# Patient Record
Sex: MALE | Race: WHITE | NOT HISPANIC OR LATINO | URBAN - METROPOLITAN AREA
[De-identification: names, ages, dates, MRNs, and addresses within clinical notes are randomized per-mention and may not be internally consistent; named-entity substitution may affect disease eponyms.]

---

## 2017-11-21 ENCOUNTER — EMERGENCY (EMERGENCY)
Facility: HOSPITAL | Age: 47
LOS: 1 days | End: 2017-11-21
Attending: EMERGENCY MEDICINE | Admitting: EMERGENCY MEDICINE
Payer: COMMERCIAL

## 2017-11-21 VITALS
WEIGHT: 182.98 LBS | RESPIRATION RATE: 18 BRPM | OXYGEN SATURATION: 98 % | SYSTOLIC BLOOD PRESSURE: 131 MMHG | TEMPERATURE: 100 F | HEIGHT: 71 IN | DIASTOLIC BLOOD PRESSURE: 76 MMHG

## 2017-11-21 DIAGNOSIS — Z98.890 OTHER SPECIFIED POSTPROCEDURAL STATES: Chronic | ICD-10-CM

## 2017-11-21 LAB
ALBUMIN SERPL ELPH-MCNC: 3.6 G/DL — SIGNIFICANT CHANGE UP (ref 3.3–5)
ALP SERPL-CCNC: 53 U/L — SIGNIFICANT CHANGE UP (ref 30–120)
ALT FLD-CCNC: 25 U/L DA — SIGNIFICANT CHANGE UP (ref 10–60)
ANION GAP SERPL CALC-SCNC: 10 MMOL/L — SIGNIFICANT CHANGE UP (ref 5–17)
APPEARANCE UR: CLEAR — SIGNIFICANT CHANGE UP
AST SERPL-CCNC: 39 U/L — SIGNIFICANT CHANGE UP (ref 10–40)
BASOPHILS # BLD AUTO: 0 K/UL — SIGNIFICANT CHANGE UP (ref 0–0.2)
BASOPHILS NFR BLD AUTO: 0.8 % — SIGNIFICANT CHANGE UP (ref 0–2)
BILIRUB SERPL-MCNC: 0.6 MG/DL — SIGNIFICANT CHANGE UP (ref 0.2–1.2)
BILIRUB UR-MCNC: NEGATIVE — SIGNIFICANT CHANGE UP
BUN SERPL-MCNC: 15 MG/DL — SIGNIFICANT CHANGE UP (ref 7–23)
CALCIUM SERPL-MCNC: 9 MG/DL — SIGNIFICANT CHANGE UP (ref 8.4–10.5)
CHLORIDE SERPL-SCNC: 101 MMOL/L — SIGNIFICANT CHANGE UP (ref 96–108)
CO2 SERPL-SCNC: 25 MMOL/L — SIGNIFICANT CHANGE UP (ref 22–31)
COLOR SPEC: YELLOW — SIGNIFICANT CHANGE UP
CREAT SERPL-MCNC: 1.14 MG/DL — SIGNIFICANT CHANGE UP (ref 0.5–1.3)
DIFF PNL FLD: ABNORMAL
EOSINOPHIL # BLD AUTO: 0 K/UL — SIGNIFICANT CHANGE UP (ref 0–0.5)
EOSINOPHIL NFR BLD AUTO: 0.2 % — SIGNIFICANT CHANGE UP (ref 0–6)
GLUCOSE SERPL-MCNC: 120 MG/DL — HIGH (ref 70–99)
GLUCOSE UR QL: NEGATIVE MG/DL — SIGNIFICANT CHANGE UP
HCT VFR BLD CALC: 43.2 % — SIGNIFICANT CHANGE UP (ref 39–50)
HGB BLD-MCNC: 14.3 G/DL — SIGNIFICANT CHANGE UP (ref 13–17)
KETONES UR-MCNC: NEGATIVE — SIGNIFICANT CHANGE UP
LACTATE SERPL-SCNC: 0.7 MMOL/L — SIGNIFICANT CHANGE UP (ref 0.7–2)
LEUKOCYTE ESTERASE UR-ACNC: ABNORMAL
LYMPHOCYTES # BLD AUTO: 0.5 K/UL — LOW (ref 1–3.3)
LYMPHOCYTES # BLD AUTO: 9.1 % — LOW (ref 13–44)
MCHC RBC-ENTMCNC: 31.4 PG — SIGNIFICANT CHANGE UP (ref 27–34)
MCHC RBC-ENTMCNC: 33.2 GM/DL — SIGNIFICANT CHANGE UP (ref 32–36)
MCV RBC AUTO: 94.5 FL — SIGNIFICANT CHANGE UP (ref 80–100)
MONOCYTES # BLD AUTO: 0.5 K/UL — SIGNIFICANT CHANGE UP (ref 0–0.9)
MONOCYTES NFR BLD AUTO: 8.7 % — SIGNIFICANT CHANGE UP (ref 2–14)
NEUTROPHILS # BLD AUTO: 4.3 K/UL — SIGNIFICANT CHANGE UP (ref 1.8–7.4)
NEUTROPHILS NFR BLD AUTO: 81.3 % — HIGH (ref 43–77)
NITRITE UR-MCNC: NEGATIVE — SIGNIFICANT CHANGE UP
PH UR: 7 — SIGNIFICANT CHANGE UP (ref 5–8)
PLATELET # BLD AUTO: 100 K/UL — LOW (ref 150–400)
POTASSIUM SERPL-MCNC: 4 MMOL/L — SIGNIFICANT CHANGE UP (ref 3.5–5.3)
POTASSIUM SERPL-SCNC: 4 MMOL/L — SIGNIFICANT CHANGE UP (ref 3.5–5.3)
PROT SERPL-MCNC: 7 G/DL — SIGNIFICANT CHANGE UP (ref 6–8.3)
PROT UR-MCNC: NEGATIVE MG/DL — SIGNIFICANT CHANGE UP
RBC # BLD: 4.57 M/UL — SIGNIFICANT CHANGE UP (ref 4.2–5.8)
RBC # FLD: 11.4 % — SIGNIFICANT CHANGE UP (ref 10.3–14.5)
SODIUM SERPL-SCNC: 136 MMOL/L — SIGNIFICANT CHANGE UP (ref 135–145)
SP GR SPEC: 1 — LOW (ref 1.01–1.02)
UROBILINOGEN FLD QL: NEGATIVE MG/DL — SIGNIFICANT CHANGE UP
WBC # BLD: 5.3 K/UL — SIGNIFICANT CHANGE UP (ref 3.8–10.5)
WBC # FLD AUTO: 5.3 K/UL — SIGNIFICANT CHANGE UP (ref 3.8–10.5)

## 2017-11-21 PROCEDURE — 81001 URINALYSIS AUTO W/SCOPE: CPT

## 2017-11-21 PROCEDURE — 99284 EMERGENCY DEPT VISIT MOD MDM: CPT

## 2017-11-21 PROCEDURE — 85027 COMPLETE CBC AUTOMATED: CPT

## 2017-11-21 PROCEDURE — 74176 CT ABD & PELVIS W/O CONTRAST: CPT

## 2017-11-21 PROCEDURE — 74176 CT ABD & PELVIS W/O CONTRAST: CPT | Mod: 26

## 2017-11-21 PROCEDURE — 96361 HYDRATE IV INFUSION ADD-ON: CPT

## 2017-11-21 PROCEDURE — 87086 URINE CULTURE/COLONY COUNT: CPT

## 2017-11-21 PROCEDURE — 96374 THER/PROPH/DIAG INJ IV PUSH: CPT

## 2017-11-21 PROCEDURE — 83605 ASSAY OF LACTIC ACID: CPT

## 2017-11-21 PROCEDURE — 99284 EMERGENCY DEPT VISIT MOD MDM: CPT | Mod: 25

## 2017-11-21 PROCEDURE — 80053 COMPREHEN METABOLIC PANEL: CPT

## 2017-11-21 PROCEDURE — 87040 BLOOD CULTURE FOR BACTERIA: CPT

## 2017-11-21 RX ORDER — SODIUM CHLORIDE 9 MG/ML
1000 INJECTION INTRAMUSCULAR; INTRAVENOUS; SUBCUTANEOUS ONCE
Qty: 0 | Refills: 0 | Status: COMPLETED | OUTPATIENT
Start: 2017-11-21 | End: 2017-11-21

## 2017-11-21 RX ORDER — CEFUROXIME AXETIL 250 MG
1 TABLET ORAL
Qty: 14 | Refills: 0 | OUTPATIENT
Start: 2017-11-21 | End: 2017-11-28

## 2017-11-21 RX ORDER — CEFTRIAXONE 500 MG/1
1 INJECTION, POWDER, FOR SOLUTION INTRAMUSCULAR; INTRAVENOUS ONCE
Qty: 0 | Refills: 0 | Status: COMPLETED | OUTPATIENT
Start: 2017-11-21 | End: 2017-11-21

## 2017-11-21 RX ADMIN — CEFTRIAXONE 100 GRAM(S): 500 INJECTION, POWDER, FOR SOLUTION INTRAMUSCULAR; INTRAVENOUS at 23:44

## 2017-11-21 RX ADMIN — SODIUM CHLORIDE 1000 MILLILITER(S): 9 INJECTION INTRAMUSCULAR; INTRAVENOUS; SUBCUTANEOUS at 22:00

## 2017-11-21 NOTE — ED ADULT TRIAGE NOTE - CHIEF COMPLAINT QUOTE
" Since this past Sunday, Im been running fever, body aches and cold sweats . I was confirmed with UTI and negative for flu "

## 2017-11-21 NOTE — ED PROVIDER NOTE - OBJECTIVE STATEMENT
47yo male who presents with body aches and fever for 2 days. pt states since sunday he has had fever up to 102.7 with body aches, no cough, no vomiting or diarrhea, pt was seen at urgent care and started on macrobid for a UTI, flu was negative, pt has been aalternating with motrin and tylenol with little relief of fever, no vomiting and diarrhea, last dose of motrin was 8pm, no other complaints

## 2017-11-21 NOTE — ED ADULT NURSE NOTE - OBJECTIVE STATEMENT
Patient presents to ED with fever, body aches and generalized malaise since Sunday. Patient reports using Advil and Motrin for fever but reports minimal relief. Denies any nausea, vomiting or diarrhea. Went to urgent care for UTI symptoms and was prescribed MacroBid

## 2017-11-22 VITALS
DIASTOLIC BLOOD PRESSURE: 64 MMHG | HEART RATE: 78 BPM | RESPIRATION RATE: 18 BRPM | TEMPERATURE: 98 F | OXYGEN SATURATION: 97 % | SYSTOLIC BLOOD PRESSURE: 105 MMHG

## 2017-11-22 RX ORDER — IBUPROFEN 200 MG
2 TABLET ORAL
Qty: 0 | Refills: 0 | COMMUNITY

## 2017-11-22 RX ORDER — NITROFURANTOIN MACROCRYSTAL 50 MG
1 CAPSULE ORAL
Qty: 0 | Refills: 0 | COMMUNITY

## 2017-11-22 NOTE — ED ADULT NURSE REASSESSMENT NOTE - NS ED NURSE REASSESS COMMENT FT1
Patient was discharged in stable condition, instructions were provided and reviewed, demonstrated understanding, left accompanied by spouse and ambulatory.

## 2017-11-23 LAB
CULTURE RESULTS: NO GROWTH — SIGNIFICANT CHANGE UP
SPECIMEN SOURCE: SIGNIFICANT CHANGE UP

## 2017-11-27 LAB
CULTURE RESULTS: SIGNIFICANT CHANGE UP
CULTURE RESULTS: SIGNIFICANT CHANGE UP
SPECIMEN SOURCE: SIGNIFICANT CHANGE UP
SPECIMEN SOURCE: SIGNIFICANT CHANGE UP

## 2017-12-28 ENCOUNTER — APPOINTMENT (OUTPATIENT)
Dept: UROLOGY | Facility: CLINIC | Age: 47
End: 2017-12-28
Payer: COMMERCIAL

## 2017-12-28 VITALS
SYSTOLIC BLOOD PRESSURE: 110 MMHG | HEART RATE: 64 BPM | WEIGHT: 182 LBS | DIASTOLIC BLOOD PRESSURE: 74 MMHG | TEMPERATURE: 97.5 F | BODY MASS INDEX: 25.48 KG/M2 | HEIGHT: 71 IN | OXYGEN SATURATION: 87 %

## 2017-12-28 DIAGNOSIS — N41.0 ACUTE PROSTATITIS: ICD-10-CM

## 2017-12-28 DIAGNOSIS — J06.9 ACUTE UPPER RESPIRATORY INFECTION, UNSPECIFIED: ICD-10-CM

## 2017-12-28 DIAGNOSIS — Z87.898 PERSONAL HISTORY OF OTHER SPECIFIED CONDITIONS: ICD-10-CM

## 2017-12-28 DIAGNOSIS — Z86.79 PERSONAL HISTORY OF OTHER DISEASES OF THE CIRCULATORY SYSTEM: ICD-10-CM

## 2017-12-28 PROCEDURE — 99203 OFFICE O/P NEW LOW 30 MIN: CPT

## 2017-12-28 RX ORDER — AZITHROMYCIN 250 MG/1
250 TABLET, FILM COATED ORAL
Qty: 6 | Refills: 0 | Status: DISCONTINUED | COMMUNITY
Start: 2017-07-26 | End: 2017-12-28

## 2017-12-28 RX ORDER — NITROFURANTOIN (MONOHYDRATE/MACROCRYSTALS) 25; 75 MG/1; MG/1
100 CAPSULE ORAL
Qty: 14 | Refills: 0 | Status: DISCONTINUED | COMMUNITY
Start: 2017-11-20 | End: 2017-12-28

## 2017-12-28 RX ORDER — CIPROFLOXACIN HYDROCHLORIDE 500 MG/1
500 TABLET, FILM COATED ORAL
Qty: 14 | Refills: 0 | Status: DISCONTINUED | COMMUNITY
Start: 2017-10-24 | End: 2017-12-28

## 2017-12-28 RX ORDER — CEFUROXIME AXETIL 500 MG/1
500 TABLET ORAL
Qty: 14 | Refills: 0 | Status: DISCONTINUED | COMMUNITY
Start: 2017-11-22 | End: 2017-12-28

## 2017-12-29 LAB
APPEARANCE: CLEAR
BACTERIA: NEGATIVE
BILIRUBIN URINE: NEGATIVE
BLOOD URINE: NEGATIVE
COLOR: YELLOW
GLUCOSE QUALITATIVE U: NEGATIVE MG/DL
HYALINE CASTS: 0 /LPF
KETONES URINE: NEGATIVE
LEUKOCYTE ESTERASE URINE: ABNORMAL
MICROSCOPIC-UA: NORMAL
NITRITE URINE: NEGATIVE
PH URINE: 6
PROTEIN URINE: NEGATIVE MG/DL
RED BLOOD CELLS URINE: 2 /HPF
SPECIFIC GRAVITY URINE: 1.01
SQUAMOUS EPITHELIAL CELLS: 0 /HPF
UROBILINOGEN URINE: NEGATIVE MG/DL
WHITE BLOOD CELLS URINE: 2 /HPF

## 2018-01-01 ENCOUNTER — TRANSCRIPTION ENCOUNTER (OUTPATIENT)
Age: 48
End: 2018-01-01

## 2018-01-02 LAB — BACTERIA UR CULT: NORMAL

## 2021-03-09 ENCOUNTER — TRANSCRIPTION ENCOUNTER (OUTPATIENT)
Age: 51
End: 2021-03-09

## 2021-03-18 ENCOUNTER — APPOINTMENT (OUTPATIENT)
Dept: SURGERY | Facility: CLINIC | Age: 51
End: 2021-03-18
Payer: COMMERCIAL

## 2021-03-18 VITALS
HEART RATE: 78 BPM | SYSTOLIC BLOOD PRESSURE: 134 MMHG | WEIGHT: 189 LBS | BODY MASS INDEX: 26.46 KG/M2 | HEIGHT: 71 IN | TEMPERATURE: 97.6 F | OXYGEN SATURATION: 100 % | DIASTOLIC BLOOD PRESSURE: 79 MMHG

## 2021-03-18 PROCEDURE — 99072 ADDL SUPL MATRL&STAF TM PHE: CPT

## 2021-03-18 PROCEDURE — 99202 OFFICE O/P NEW SF 15 MIN: CPT

## 2021-03-18 NOTE — PLAN
[FreeTextEntry1] : All risks, benefits and options discussed. Recommend elective excision of scalp mass

## 2021-03-18 NOTE — HISTORY OF PRESENT ILLNESS
[de-identified] : right scalp mass [de-identified] : 50 year old white male who presents with a right scalp mass that has become more noticeable as he is losing his hair. He has had it for the last few years and has not noticed any change in the size and has had no drainage from the area.

## 2021-03-18 NOTE — PHYSICAL EXAM
[JVD] : no jugular venous distention  [Normal Thyroid] : the thyroid was normal [Carotid Bruits] : no carotid bruits [Normal Breath Sounds] : Normal breath sounds [Normal Heart Sounds] : normal heart sounds [Normal Rate and Rhythm] : normal rate and rhythm [No Rash or Lesion] : No rash or lesion [Alert] : alert [Oriented to Person] : oriented to person [Oriented to Place] : oriented to place [Oriented to Time] : oriented to time [Calm] : calm [de-identified] : well developed white male in no acute distress [de-identified] : 3 by 3 mass [de-identified] : without adenopathy [de-identified] : benign [de-identified] : without calf pain or swelling

## 2021-03-26 ENCOUNTER — OUTPATIENT (OUTPATIENT)
Dept: OUTPATIENT SERVICES | Facility: HOSPITAL | Age: 51
LOS: 1 days | End: 2021-03-26
Payer: COMMERCIAL

## 2021-03-26 DIAGNOSIS — Z20.828 CONTACT WITH AND (SUSPECTED) EXPOSURE TO OTHER VIRAL COMMUNICABLE DISEASES: ICD-10-CM

## 2021-03-26 DIAGNOSIS — Z98.890 OTHER SPECIFIED POSTPROCEDURAL STATES: Chronic | ICD-10-CM

## 2021-03-26 LAB — SARS-COV-2 RNA SPEC QL NAA+PROBE: SIGNIFICANT CHANGE UP

## 2021-03-26 PROCEDURE — U0003: CPT

## 2021-03-26 PROCEDURE — U0005: CPT

## 2021-03-28 ENCOUNTER — TRANSCRIPTION ENCOUNTER (OUTPATIENT)
Age: 51
End: 2021-03-28

## 2021-03-29 ENCOUNTER — OUTPATIENT (OUTPATIENT)
Dept: OUTPATIENT SERVICES | Facility: HOSPITAL | Age: 51
LOS: 1 days | End: 2021-03-29
Payer: COMMERCIAL

## 2021-03-29 ENCOUNTER — APPOINTMENT (OUTPATIENT)
Dept: SURGERY | Facility: HOSPITAL | Age: 51
End: 2021-03-29

## 2021-03-29 ENCOUNTER — RESULT REVIEW (OUTPATIENT)
Age: 51
End: 2021-03-29

## 2021-03-29 DIAGNOSIS — Z98.890 OTHER SPECIFIED POSTPROCEDURAL STATES: Chronic | ICD-10-CM

## 2021-03-29 DIAGNOSIS — D48.1 NEOPLASM OF UNCERTAIN BEHAVIOR OF CONNECTIVE AND OTHER SOFT TISSUE: ICD-10-CM

## 2021-03-29 PROCEDURE — 88305 TISSUE EXAM BY PATHOLOGIST: CPT

## 2021-03-29 PROCEDURE — 88305 TISSUE EXAM BY PATHOLOGIST: CPT | Mod: 26

## 2021-03-29 PROCEDURE — 11424 EXC H-F-NK-SP B9+MARG 3.1-4: CPT

## 2021-03-29 PROCEDURE — 21012 EXC FACE LES SBQ 2 CM/>: CPT

## 2021-03-30 LAB — SURGICAL PATHOLOGY STUDY: SIGNIFICANT CHANGE UP

## 2021-04-05 ENCOUNTER — APPOINTMENT (OUTPATIENT)
Dept: SURGERY | Facility: CLINIC | Age: 51
End: 2021-04-05

## 2021-04-05 VITALS — TEMPERATURE: 97.8 F

## 2021-04-05 NOTE — PHYSICAL EXAM
[Normal Breath Sounds] : Normal breath sounds [Normal Heart Sounds] : normal heart sounds [Normal Rate and Rhythm] : normal rate and rhythm [de-identified] : well developed male in no distress [de-identified] : incision clean and closed, 1/2 sutures d/janeth. [de-identified] : benign

## 2021-04-05 NOTE — HISTORY OF PRESENT ILLNESS
[de-identified] : s/p excision of scalp mass on 3/29/21 [de-identified] : pt without complaints, minimal pain. Without redness or swelling.

## 2021-04-12 ENCOUNTER — APPOINTMENT (OUTPATIENT)
Dept: SURGERY | Facility: CLINIC | Age: 51
End: 2021-04-12
Payer: COMMERCIAL

## 2021-04-12 VITALS — TEMPERATURE: 97.6 F

## 2021-04-12 DIAGNOSIS — R22.0 LOCALIZED SWELLING, MASS AND LUMP, HEAD: ICD-10-CM

## 2021-04-12 PROCEDURE — 99024 POSTOP FOLLOW-UP VISIT: CPT

## 2021-04-12 NOTE — PHYSICAL EXAM
[Normal Breath Sounds] : Normal breath sounds [Normal Heart Sounds] : normal heart sounds [Normal Rate and Rhythm] : normal rate and rhythm [de-identified] : scalp incision clean and closed, sutures d/janeth [de-identified] : well developed male in no acute distress

## 2021-04-12 NOTE — HISTORY OF PRESENT ILLNESS
[de-identified] : s/p excision of scalp mass on 3/29/21 [de-identified] : for suture removal. Without complaints

## 2022-02-16 ENCOUNTER — NON-APPOINTMENT (OUTPATIENT)
Age: 52
End: 2022-02-16

## 2022-02-17 ENCOUNTER — APPOINTMENT (OUTPATIENT)
Dept: SURGERY | Facility: CLINIC | Age: 52
End: 2022-02-17
Payer: COMMERCIAL

## 2022-02-17 VITALS
HEIGHT: 71 IN | WEIGHT: 189 LBS | DIASTOLIC BLOOD PRESSURE: 69 MMHG | OXYGEN SATURATION: 100 % | TEMPERATURE: 97.6 F | BODY MASS INDEX: 26.46 KG/M2 | HEART RATE: 81 BPM | SYSTOLIC BLOOD PRESSURE: 133 MMHG

## 2022-02-17 DIAGNOSIS — M79.89 OTHER SPECIFIED SOFT TISSUE DISORDERS: ICD-10-CM

## 2022-02-17 DIAGNOSIS — Z78.9 OTHER SPECIFIED HEALTH STATUS: ICD-10-CM

## 2022-02-17 PROCEDURE — 99212 OFFICE O/P EST SF 10 MIN: CPT

## 2022-02-17 NOTE — ASSESSMENT
[FreeTextEntry1] : I have discussed with pt that because of the location of the mass he needs to see a hand surgeon.

## 2022-02-17 NOTE — HISTORY OF PRESENT ILLNESS
[de-identified] : new mass of right palm [de-identified] : 51 year old white male c/o a new mass to the right palm for the past few weeks. He denies any pain in the area and has no history of trauma to the area.

## 2022-02-17 NOTE — PHYSICAL EXAM
[Normal Breath Sounds] : Normal breath sounds [Normal Heart Sounds] : normal heart sounds [Normal Rate and Rhythm] : normal rate and rhythm [Calm] : calm [de-identified] : well developed male in no distress [de-identified] : noninjected and nonicteric [de-identified] : without adenopathy [de-identified] : benign [de-identified] : right axilla without adenopathy, with a 3 by 3 cm hard mass of palm

## 2022-03-24 ENCOUNTER — RESULT REVIEW (OUTPATIENT)
Age: 52
End: 2022-03-24

## 2023-05-02 ENCOUNTER — NON-APPOINTMENT (OUTPATIENT)
Age: 53
End: 2023-05-02

## 2023-05-03 ENCOUNTER — NON-APPOINTMENT (OUTPATIENT)
Age: 53
End: 2023-05-03

## 2023-05-07 NOTE — ED PROVIDER NOTE - PSYCHIATRIC, MLM
[[email or web address]:[id number]==] Alert and oriented to person, place, time/situation. normal mood and affect. no apparent risk to self or others.

## 2023-05-09 ENCOUNTER — NON-APPOINTMENT (OUTPATIENT)
Age: 53
End: 2023-05-09

## 2023-05-09 ENCOUNTER — APPOINTMENT (OUTPATIENT)
Dept: OTOLARYNGOLOGY | Facility: CLINIC | Age: 53
End: 2023-05-09
Payer: COMMERCIAL

## 2023-05-09 VITALS
SYSTOLIC BLOOD PRESSURE: 146 MMHG | HEIGHT: 71 IN | BODY MASS INDEX: 24.78 KG/M2 | DIASTOLIC BLOOD PRESSURE: 76 MMHG | HEART RATE: 86 BPM | WEIGHT: 177 LBS

## 2023-05-09 PROCEDURE — 99204 OFFICE O/P NEW MOD 45 MIN: CPT | Mod: 25

## 2023-05-09 PROCEDURE — 31575 DIAGNOSTIC LARYNGOSCOPY: CPT

## 2023-05-09 RX ORDER — OFLOXACIN 3 MG/ML
0.3 SOLUTION/ DROPS OPHTHALMIC
Qty: 5 | Refills: 0 | Status: DISCONTINUED | COMMUNITY
Start: 2017-07-26 | End: 2023-05-09

## 2023-05-09 RX ORDER — CHLORHEXIDINE GLUCONATE, 0.12% ORAL RINSE 1.2 MG/ML
0.12 SOLUTION DENTAL
Qty: 473 | Refills: 0 | Status: DISCONTINUED | COMMUNITY
Start: 2017-12-02 | End: 2023-05-09

## 2023-05-09 NOTE — ASSESSMENT
[FreeTextEntry1] : no mucosal lesions seen in the oral cavity\par \par poss LPR\par diet/lifestyle instructions given to pt\par will hold off on meds pending results of testing\par \par strobe, FEES\par CT neck\par \par take photos of any oral lesions\par \par f/u after testing is complete

## 2023-05-09 NOTE — PHYSICAL EXAM
[Normal] : mucosa is normal [Midline] : trachea located in midline position [de-identified] : no sores or lesions

## 2023-05-09 NOTE — HISTORY OF PRESENT ILLNESS
[de-identified] : 52 yr old male c/o sore throat pain, swollen glands, sores on his gums and tongue, pain radiates to left ear since March.\par +recent hoarseness, occ throat clearing\par no improvement s tx w Zpak, valtrex. Felt  better for little while after Augmentin, but sore throat recurred\par STD testing negative, PFT's, complete physical and labs WNL\par \par smoked for a year in his 20's, social smoker now\par \par -cough, SOB, fever, weight loss, heartburn, GERD

## 2023-05-09 NOTE — REVIEW OF SYSTEMS
[Ear Pain] : ear pain [Ear Itch] : ear itch [Sinus Pain] : sinus pain [Sinus Pressure] : sinus pressure [Throat Pain] : throat pain [Negative] : Heme/Lymph [de-identified] : difficulty swallowing  [FreeTextEntry1] : swelling of neck/face, headache

## 2023-05-14 ENCOUNTER — NON-APPOINTMENT (OUTPATIENT)
Age: 53
End: 2023-05-14

## 2023-05-19 ENCOUNTER — APPOINTMENT (OUTPATIENT)
Dept: OTOLARYNGOLOGY | Facility: CLINIC | Age: 53
End: 2023-05-19

## 2023-06-01 ENCOUNTER — NON-APPOINTMENT (OUTPATIENT)
Age: 53
End: 2023-06-01

## 2023-06-02 ENCOUNTER — NON-APPOINTMENT (OUTPATIENT)
Age: 53
End: 2023-06-02

## 2023-06-02 ENCOUNTER — APPOINTMENT (OUTPATIENT)
Dept: OTOLARYNGOLOGY | Facility: CLINIC | Age: 53
End: 2023-06-02
Payer: COMMERCIAL

## 2023-06-02 PROCEDURE — 31579 LARYNGOSCOPY TELESCOPIC: CPT | Mod: GN

## 2023-06-02 NOTE — ASSESSMENT
[FreeTextEntry1] : Patient participated in a videostroboscopy and FEES this AM. Full report is scanned into the AEHR.\par \par Radha Barrera MA, CCC-SLP\par Speech-Language Pathologist

## 2023-06-08 ENCOUNTER — NON-APPOINTMENT (OUTPATIENT)
Age: 53
End: 2023-06-08

## 2023-06-08 ENCOUNTER — APPOINTMENT (OUTPATIENT)
Dept: OTOLARYNGOLOGY | Facility: CLINIC | Age: 53
End: 2023-06-08

## 2023-06-12 ENCOUNTER — APPOINTMENT (OUTPATIENT)
Dept: OTOLARYNGOLOGY | Facility: CLINIC | Age: 53
End: 2023-06-12
Payer: COMMERCIAL

## 2023-06-12 VITALS
SYSTOLIC BLOOD PRESSURE: 127 MMHG | BODY MASS INDEX: 24.92 KG/M2 | HEIGHT: 71 IN | DIASTOLIC BLOOD PRESSURE: 79 MMHG | HEART RATE: 76 BPM | WEIGHT: 178 LBS

## 2023-06-12 DIAGNOSIS — K12.1 OTHER FORMS OF STOMATITIS: ICD-10-CM

## 2023-06-12 PROCEDURE — 99214 OFFICE O/P EST MOD 30 MIN: CPT

## 2023-06-12 NOTE — DATA REVIEWED
[de-identified] : 5/12 CT neck: scattered small cervical lymph nodes [de-identified] : 6/2 strobe and FEES: evidenced of LPR

## 2023-06-12 NOTE — ASSESSMENT
[FreeTextEntry1] : diet/lifestyle mod instructions previously given to pt\par rx omeprazole bid\par \par eval by Dr Silverman\par \par f/u 1 month

## 2023-06-12 NOTE — PHYSICAL EXAM
[Normal] : mucosa is normal [Midline] : trachea located in midline position [de-identified] : small painful red dot on gingival mucosa left post mandible

## 2023-06-12 NOTE — HISTORY OF PRESENT ILLNESS
[de-identified] : 52 yr old male c/o sore throat pain, swollen glands, sores on his gums and tongue, pain radiates to left ear since March.\par +recent hoarseness, occ throat clearing\par no improvement s tx w Zpak, valtrex. Felt  better for little while after Augmentin, but sore throat recurred\par STD testing negative, PFT's, complete physical and labs WNL\par \par smoked for a year in his 20's, social smoker now\par \par -cough, SOB, fever, weight loss, heartburn, GERD\par \par \par comes in today for results of CT, FEES and strobe

## 2023-06-12 NOTE — REVIEW OF SYSTEMS
[Ear Pain] : ear pain [Ear Itch] : ear itch [Sinus Pain] : sinus pain [Sinus Pressure] : sinus pressure [Throat Pain] : throat pain [Negative] : Heme/Lymph [de-identified] : difficulty swallowing  [FreeTextEntry1] : swelling of neck/face, headache

## 2023-07-31 ENCOUNTER — APPOINTMENT (OUTPATIENT)
Dept: OTOLARYNGOLOGY | Facility: CLINIC | Age: 53
End: 2023-07-31
Payer: COMMERCIAL

## 2023-07-31 VITALS
HEIGHT: 71 IN | WEIGHT: 177 LBS | HEART RATE: 72 BPM | DIASTOLIC BLOOD PRESSURE: 81 MMHG | SYSTOLIC BLOOD PRESSURE: 136 MMHG | BODY MASS INDEX: 24.78 KG/M2

## 2023-07-31 PROCEDURE — 99213 OFFICE O/P EST LOW 20 MIN: CPT

## 2023-07-31 RX ORDER — OMEPRAZOLE 20 MG/1
20 CAPSULE, DELAYED RELEASE ORAL
Qty: 90 | Refills: 3 | Status: DISCONTINUED | COMMUNITY
Start: 2023-06-12 | End: 2023-07-31

## 2023-07-31 NOTE — REVIEW OF SYSTEMS
[Ear Pain] : ear pain [Ear Itch] : ear itch [Sinus Pain] : sinus pain [Sinus Pressure] : sinus pressure [Throat Pain] : throat pain [Negative] : Heme/Lymph [de-identified] : difficulty swallowing  [FreeTextEntry1] : swelling of neck/face, headache

## 2023-07-31 NOTE — PHYSICAL EXAM
[Normal] : mucosa is normal [Midline] : trachea located in midline position [de-identified] : small painful red dot on gingival mucosa left post mandible

## 2023-07-31 NOTE — HISTORY OF PRESENT ILLNESS
[de-identified] : 52 yr old male had sore throat pain, swollen glands, sores on his gums and tongue, pain radiates to left ear since March-June with hoarseness, occ throat clearing no improvement s tx w Zpak, valtrex. Felt  better for little while after Augmentin, but sore throat recurred STD testing negative, PFT's, complete physical and labs WNL  smoked for a year in his 20's, social smoker now  -cough, SOB, fever, weight loss, heartburn, GERD  6/12 started on omeprazole bid with almost immediate improvement in symptoms

## 2023-09-07 ENCOUNTER — APPOINTMENT (OUTPATIENT)
Age: 53
End: 2023-09-07
Payer: COMMERCIAL

## 2023-09-07 PROCEDURE — 99203 OFFICE O/P NEW LOW 30 MIN: CPT

## 2023-10-09 ENCOUNTER — APPOINTMENT (OUTPATIENT)
Dept: OTOLARYNGOLOGY | Facility: CLINIC | Age: 53
End: 2023-10-09
Payer: COMMERCIAL

## 2023-10-09 VITALS
BODY MASS INDEX: 24.92 KG/M2 | SYSTOLIC BLOOD PRESSURE: 120 MMHG | DIASTOLIC BLOOD PRESSURE: 81 MMHG | WEIGHT: 178 LBS | HEIGHT: 71 IN | HEART RATE: 70 BPM

## 2023-10-09 DIAGNOSIS — R49.0 DYSPHONIA: ICD-10-CM

## 2023-10-09 PROCEDURE — 99214 OFFICE O/P EST MOD 30 MIN: CPT | Mod: 25

## 2023-10-09 PROCEDURE — 31575 DIAGNOSTIC LARYNGOSCOPY: CPT

## 2023-10-09 RX ORDER — OMEPRAZOLE 20 MG/1
20 CAPSULE, DELAYED RELEASE ORAL
Qty: 180 | Refills: 3 | Status: ACTIVE | COMMUNITY
Start: 2023-10-09 | End: 1900-01-01

## 2023-10-09 RX ORDER — FAMOTIDINE 20 MG/1
20 TABLET, FILM COATED ORAL TWICE DAILY
Qty: 180 | Refills: 3 | Status: DISCONTINUED | COMMUNITY
Start: 2023-07-31 | End: 2023-10-09

## 2023-10-09 RX ORDER — OMEPRAZOLE MAGNESIUM 10 MG/1
10 CAPSULE, DELAYED RELEASE ORAL
Refills: 0 | Status: ACTIVE | COMMUNITY

## 2024-01-09 ENCOUNTER — APPOINTMENT (OUTPATIENT)
Dept: OTOLARYNGOLOGY | Facility: CLINIC | Age: 54
End: 2024-01-09
Payer: COMMERCIAL

## 2024-01-09 VITALS
WEIGHT: 177 LBS | HEART RATE: 89 BPM | SYSTOLIC BLOOD PRESSURE: 129 MMHG | BODY MASS INDEX: 24.78 KG/M2 | HEIGHT: 71 IN | DIASTOLIC BLOOD PRESSURE: 74 MMHG

## 2024-01-09 DIAGNOSIS — K21.9 GASTRO-ESOPHAGEAL REFLUX DISEASE W/OUT ESOPHAGITIS: ICD-10-CM

## 2024-01-09 DIAGNOSIS — R07.0 PAIN IN THROAT: ICD-10-CM

## 2024-01-09 PROCEDURE — 99213 OFFICE O/P EST LOW 20 MIN: CPT

## 2024-05-02 ENCOUNTER — APPOINTMENT (OUTPATIENT)
Dept: OTOLARYNGOLOGY | Facility: CLINIC | Age: 54
End: 2024-05-02
Payer: COMMERCIAL

## 2024-05-02 VITALS
HEART RATE: 73 BPM | OXYGEN SATURATION: 99 % | WEIGHT: 180 LBS | DIASTOLIC BLOOD PRESSURE: 80 MMHG | BODY MASS INDEX: 25.2 KG/M2 | SYSTOLIC BLOOD PRESSURE: 130 MMHG | TEMPERATURE: 98 F | HEIGHT: 71 IN

## 2024-05-02 DIAGNOSIS — Z87.891 PERSONAL HISTORY OF NICOTINE DEPENDENCE: ICD-10-CM

## 2024-05-02 DIAGNOSIS — Z78.9 OTHER SPECIFIED HEALTH STATUS: ICD-10-CM

## 2024-05-02 PROCEDURE — 99203 OFFICE O/P NEW LOW 30 MIN: CPT

## 2024-05-07 NOTE — ED ADULT NURSE NOTE - INTEGUMENTARY WDL
----- Message from Babak Jeronimo MD sent at 5/7/2024  8:58 AM EDT -----  Glucose 113 advised low-carb diet TSH 4.90    Advised to see dermatology Dr. Napoleon Adames for excessive sweat  
Color consistent with ethnicity/race, warm, dry intact, resilient.

## 2024-05-21 NOTE — PHYSICAL EXAM
[Normal] : no rashes [de-identified] : Recurrent oral ulcers and lesions Reflux issues ro systemic stomatitis and oral lesions Behcet?  for Bx infuture

## 2024-05-21 NOTE — CONSULT LETTER
[Dear  ___] : Dear  [unfilled], [Consult Letter:] : I had the pleasure of evaluating your patient, [unfilled]. [Please see my note below.] : Please see my note below. [Consult Closing:] : Thank you very much for allowing me to participate in the care of this patient.  If you have any questions, please do not hesitate to contact me. [Sincerely,] : Sincerely, [FreeTextEntry3] : Moris Kumar MD, FACS Professor of Otolaryngology, [employer] [employer] at [facility] Director, [facility], Department of Otolaryngology, [facility] , Head & Neck Service Line, [facility]

## 2024-05-21 NOTE — HISTORY OF PRESENT ILLNESS
[de-identified] : 54 y/o M presents to the office with C/O sore throat and persistent oral sores/lesions in different areas of mouth. Pt reports each lesion lasts 1 week. Pt denies heartburn. ENT specialist prescribed Omeprazole with no relief. Pt also reports pain in left back side of mouth. Pt states pressure aggravates the pain inside mouth.  Recurrent oral ulcers and lesions Reflux issues ro systemic stomatitis and oral lesions Behcet?